# Patient Record
Sex: FEMALE | Race: BLACK OR AFRICAN AMERICAN | NOT HISPANIC OR LATINO | URBAN - METROPOLITAN AREA
[De-identification: names, ages, dates, MRNs, and addresses within clinical notes are randomized per-mention and may not be internally consistent; named-entity substitution may affect disease eponyms.]

---

## 2019-06-25 ENCOUNTER — EMERGENCY (EMERGENCY)
Facility: HOSPITAL | Age: 29
LOS: 1 days | Discharge: HOME | End: 2019-06-25
Attending: EMERGENCY MEDICINE
Payer: MEDICAID

## 2019-06-25 VITALS
DIASTOLIC BLOOD PRESSURE: 73 MMHG | HEIGHT: 63 IN | WEIGHT: 110.01 LBS | HEART RATE: 74 BPM | OXYGEN SATURATION: 99 % | TEMPERATURE: 98 F | SYSTOLIC BLOOD PRESSURE: 111 MMHG | RESPIRATION RATE: 16 BRPM

## 2019-06-25 PROCEDURE — 99283 EMERGENCY DEPT VISIT LOW MDM: CPT

## 2019-06-26 VITALS — WEIGHT: 125 LBS | HEIGHT: 63 IN

## 2019-06-26 PROCEDURE — 99283 EMERGENCY DEPT VISIT LOW MDM: CPT

## 2019-06-26 RX ORDER — CEPHALEXIN 500 MG
500 CAPSULE ORAL ONCE
Refills: 0 | Status: COMPLETED | OUTPATIENT
Start: 2019-06-26 | End: 2019-06-26

## 2019-06-26 RX ORDER — CEPHALEXIN 500 MG
1 CAPSULE ORAL
Qty: 14 | Refills: 0
Start: 2019-06-26 | End: 2019-07-02

## 2019-06-26 RX ADMIN — Medication 500 MILLIGRAM(S): at 01:28

## 2019-06-26 NOTE — ED PROVIDER NOTE - CARE PROVIDERS DIRECT ADDRESSES
[email or web address]

## 2019-06-26 NOTE — ED PROVIDER NOTE - NSFOLLOWUPINSTRUCTIONS_ED_ALL_ED_FT
Take medications as prescribed.  Apply steroid cream twice daily.  Followup with dermatologist if rash does not improve.    Return to ED if you develop fever >100.8F or worsening rash.

## 2019-06-26 NOTE — ED PROVIDER NOTE - CARE PROVIDER_API CALL
Ade Barrett)  DermatologyDermatopathology  [address] [place] [address]  Phone: [phone]  Fax: [phone]  Follow Up Time:

## 2019-06-26 NOTE — ED PROVIDER NOTE - OBJECTIVE STATEMENT
29 y/o F patient with no significant PMHx and no significant PSHx presents to the ED with c/o rash in the left arm, that is reddish. Patient reports that was resolved expect in one area. Patient states . Patient denies any using new soap, detergents, going to the woods, poison ivy, or any other complains. Allergies: 29 y/o F patient with no significant PMHx and no significant PSHx presents to the ED with c/o rash in the left arm, that is reddish. Patient reports at onset it was blotchy and red all over L forearm then mostly resolved except for a small area where she developed a blister. Patient denies any using new soap, detergents, travel to wooded areas, poison ivy exposure, or any other complaints. Denies fevers or sick contacts. Allergies:

## 2019-06-26 NOTE — ED PROVIDER NOTE - CLINICAL SUMMARY MEDICAL DECISION MAKING FREE TEXT BOX
27yo F presents with swelling and pain over L forearm. Exam shows 2 small bullae over l forearm. Likely from contact dermatitis, unknown trigger.  given keflex for ppx. Patient stable for discharge to f/u with Dermatology as outpatient.

## 2019-06-26 NOTE — ED PROVIDER NOTE - SKIN RASH DESCRIPTION
2 cm area small bullae on the volar surface with surrounding erythremia 2 cm area small bullae on the volar surface of L forearm with surrounding erythremia

## 2021-05-23 NOTE — ED ADULT NURSE NOTE - SKIN CAPILLARY REFILL
Occupational Therapy Assessment     05/23/21 4300   Presenting Information and Cognitive Status   Alertness / Attention Span Alert;Attends to interview   Orientation Person;Place;Situation   Mood / Affect Blunted / Depressed   Physical Appearance Lacks attention   Self Esteem Diminished   Memory Appears functional   Comprehension Comprehends interview questions   Insight / Judgment Demonstrates insight into illness;Impaired judgment   Verbal Communication / Speech Expresses thoughts clearly   Psychomotor Activity Calm   Follows Directions Verbal   Independent Living Skills   Living Situation Living with family    Support Persons Mother and sister    Self Care   Feeding Independent   Bathing Independent   Grooming Independent   Dressing Independent   Oral Hygiene Independent   Toileting Independent   Medication Management Independent   Functional Communication Independent   Vocational   Level of Education Completed Currently in school online   Are You Retired? No   Do You Have a Disability? 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 Part-Time   Household Management   Meal Preparation Independent   Food Shopping Independent   Laundry Independent   Housekeeping Independent   Yard Work Independent   Transportation Independent   Money Management Independent   Safety Procedures Needs Assist   Physical Limitations   Any Physical Limitations? WFL   Social / Leisure Activities   Any Social / Leisure Activities Playing cards   Plan   Problem Areas Identified Compromised safety;Ineffective coping;Diminished self-esteem;Impaired concentration   Patient's Strengths: Pt is employed and in school, willing to partiicpate in interview, open to groups, able to set goal   Patient's Stated Goal: \"I want to try groups\"   Occupational Therapy Plan: Goal: Improve occupational functioning as evidenced by engagement in ADLs, healthy roles and routines. Tasks: Improve coping skills as evidenced  by engaging in healthy coping activities/strategies to decrease stress behaviors.  Improve time management as evidenced by daily goal setting, creation of a balanced schedule of activities and role exploration/development.  Improve task skills: concentration, decision-making, planning/organizing and problem solving as evidenced by improved performance in functional/therapeutic activities.  Improve communication-interaction skills as evidenced by learning/demonstrating assertiveness skills in various interpersonal situations.   OT Therapist Name Makayla Moreland MS, OTR/L   Date of OT Screening 05/23/21      2 seconds or less
